# Patient Record
Sex: MALE | Race: BLACK OR AFRICAN AMERICAN | NOT HISPANIC OR LATINO | Employment: OTHER | URBAN - METROPOLITAN AREA
[De-identification: names, ages, dates, MRNs, and addresses within clinical notes are randomized per-mention and may not be internally consistent; named-entity substitution may affect disease eponyms.]

---

## 2024-02-15 ENCOUNTER — APPOINTMENT (OUTPATIENT)
Dept: PRIMARY CARE | Facility: CLINIC | Age: 49
End: 2024-02-15
Payer: COMMERCIAL

## 2024-03-05 ENCOUNTER — TELEPHONE (OUTPATIENT)
Dept: PRIMARY CARE | Facility: CLINIC | Age: 49
End: 2024-03-05
Payer: COMMERCIAL

## 2024-04-01 ENCOUNTER — OFFICE VISIT (OUTPATIENT)
Dept: PRIMARY CARE | Facility: CLINIC | Age: 49
End: 2024-04-01
Payer: COMMERCIAL

## 2024-04-01 VITALS
OXYGEN SATURATION: 94 % | WEIGHT: 271 LBS | RESPIRATION RATE: 16 BRPM | HEART RATE: 100 BPM | SYSTOLIC BLOOD PRESSURE: 127 MMHG | DIASTOLIC BLOOD PRESSURE: 84 MMHG | TEMPERATURE: 98 F

## 2024-04-01 DIAGNOSIS — I10 ESSENTIAL HYPERTENSION: ICD-10-CM

## 2024-04-01 DIAGNOSIS — E11.9 TYPE 2 DIABETES MELLITUS WITHOUT COMPLICATION, WITHOUT LONG-TERM CURRENT USE OF INSULIN (MULTI): Primary | ICD-10-CM

## 2024-04-01 PROBLEM — J45.20 MILD INTERMITTENT ASTHMA WITHOUT COMPLICATION (HHS-HCC): Status: ACTIVE | Noted: 2021-12-01

## 2024-04-01 PROCEDURE — 3074F SYST BP LT 130 MM HG: CPT | Performed by: STUDENT IN AN ORGANIZED HEALTH CARE EDUCATION/TRAINING PROGRAM

## 2024-04-01 PROCEDURE — 99204 OFFICE O/P NEW MOD 45 MIN: CPT | Performed by: STUDENT IN AN ORGANIZED HEALTH CARE EDUCATION/TRAINING PROGRAM

## 2024-04-01 PROCEDURE — 3079F DIAST BP 80-89 MM HG: CPT | Performed by: STUDENT IN AN ORGANIZED HEALTH CARE EDUCATION/TRAINING PROGRAM

## 2024-04-01 RX ORDER — GLIPIZIDE 10 MG/1
10 TABLET ORAL
COMMUNITY
End: 2024-04-01 | Stop reason: SDUPTHER

## 2024-04-01 RX ORDER — HYDROCHLOROTHIAZIDE 25 MG/1
25 TABLET ORAL
Qty: 30 TABLET | Refills: 1 | Status: SHIPPED | OUTPATIENT
Start: 2024-04-01 | End: 2024-05-31

## 2024-04-01 RX ORDER — DULAGLUTIDE 0.75 MG/.5ML
INJECTION, SOLUTION SUBCUTANEOUS
Qty: 2 ML | Refills: 1 | Status: SHIPPED | OUTPATIENT
Start: 2024-04-01

## 2024-04-01 RX ORDER — HYDROCHLOROTHIAZIDE 25 MG/1
25 TABLET ORAL
COMMUNITY
Start: 2020-11-20 | End: 2024-04-01 | Stop reason: SDUPTHER

## 2024-04-01 RX ORDER — GLIPIZIDE 10 MG/1
10 TABLET ORAL
Qty: 60 TABLET | Refills: 1 | Status: SHIPPED | OUTPATIENT
Start: 2024-04-01 | End: 2024-05-31

## 2024-04-01 RX ORDER — DULAGLUTIDE 0.75 MG/.5ML
INJECTION, SOLUTION SUBCUTANEOUS
COMMUNITY
End: 2024-04-01 | Stop reason: SDUPTHER

## 2024-04-01 ASSESSMENT — ENCOUNTER SYMPTOMS
NAUSEA: 0
SHORTNESS OF BREATH: 0
FEVER: 0
COUGH: 0
CONSTIPATION: 0
ACTIVITY CHANGE: 0
WEAKNESS: 0
SPEECH DIFFICULTY: 0
HEMATURIA: 0
DIZZINESS: 0
WHEEZING: 0
VOMITING: 0
NUMBNESS: 0
DYSURIA: 0
ABDOMINAL PAIN: 0

## 2024-04-01 NOTE — PROGRESS NOTES
"Subjective   Patient ID: Lila Harrell is a 48 y.o. male who presents for Establish Care.  HPI    Patient is 48 years old with a history of diabetes, hypertension and hyperlipidemia is here to establish care.  Denies any concerns today.  Request medication refills.  Does not remember the name of the medications just reports \"diabetic medications, cholesterol medications and blood pressure medications\"  No other concerns endorsed  Denies any surgeries or hospitalizations in the past except for ureteral stones  Reports to use tobacco-Black and mild every day.  Reports occasional alcohol use.  Denies recreational drug use    Family history significant for diabetes and hypertension in mom      Review of Systems   Constitutional:  Negative for activity change and fever.   HENT:  Negative for congestion.    Respiratory:  Negative for cough, shortness of breath and wheezing.    Cardiovascular:  Negative for chest pain and leg swelling.   Gastrointestinal:  Negative for abdominal pain, constipation, nausea and vomiting.   Endocrine: Negative for cold intolerance.   Genitourinary:  Negative for dysuria, hematuria and urgency.   Neurological:  Negative for dizziness, speech difficulty, weakness and numbness.   Psychiatric/Behavioral:  Negative for self-injury and suicidal ideas.        Objective   Visit Vitals  /84   Pulse 100   Temp 36.7 °C (98 °F)   Resp 16   Wt 123 kg (271 lb)   SpO2 94%      Physical Exam  Constitutional:       Appearance: Normal appearance.   HENT:      Head: Normocephalic and atraumatic.      Nose: Nose normal.      Mouth/Throat:      Mouth: Mucous membranes are moist.   Eyes:      Conjunctiva/sclera: Conjunctivae normal.      Pupils: Pupils are equal, round, and reactive to light.   Cardiovascular:      Rate and Rhythm: Normal rate and regular rhythm.      Pulses: Normal pulses.      Heart sounds: Normal heart sounds.   Pulmonary:      Effort: Pulmonary effort is normal.      Breath sounds: Normal " breath sounds.   Musculoskeletal:         General: Normal range of motion.      Cervical back: Neck supple.   Skin:     General: Skin is warm.   Neurological:      General: No focal deficit present.      Mental Status: He is alert and oriented to person, place, and time.   Psychiatric:         Mood and Affect: Mood normal.         Behavior: Behavior normal.         Thought Content: Thought content normal.         Judgment: Judgment normal.         Assessment/Plan   Diagnoses and all orders for this visit:  Type 2 diabetes mellitus without complication, without long-term current use of insulin (CMS/[facility])  We discussed on getting blood work today.  We discussed on lifestyle modification and continuing Trulicity and glipizide for now until blood work is obtained.  Once blood work is up-to-date patient to see me in 2 weeks for a follow-up and discussion on medication.  [Interested in getting Ozempic/Mounjaro]  He does not remember the name office cholesterol medication which he reports he will bring in the bottle next visit  -     Hemoglobin A1C; Future  -     Albumin , Urine Random; Future  -     Creatinine, Urine Random; Future  -     Lipid Panel; Future  -     Comprehensive Metabolic Panel; Future  -     TSH with reflex to Free T4 if abnormal; Future  -     CBC; Future  Essential hypertension  Blood pressure at goal.  To continue hydrochlorothiazide [reports he has not taken his medication for the past 1 week]  Follow-up in 2 weeks for review on labs and discussion on Ozempic/Mounjaro based on labs  Patient verbalized understanding agreeable to plan

## 2024-12-08 ENCOUNTER — APPOINTMENT (OUTPATIENT)
Dept: RADIOLOGY | Facility: HOSPITAL | Age: 49
End: 2024-12-08
Payer: COMMERCIAL

## 2024-12-08 ENCOUNTER — HOSPITAL ENCOUNTER (EMERGENCY)
Facility: HOSPITAL | Age: 49
Discharge: HOME | End: 2024-12-08
Attending: EMERGENCY MEDICINE
Payer: COMMERCIAL

## 2024-12-08 ENCOUNTER — CLINICAL SUPPORT (OUTPATIENT)
Dept: EMERGENCY MEDICINE | Facility: HOSPITAL | Age: 49
End: 2024-12-08
Payer: COMMERCIAL

## 2024-12-08 VITALS
OXYGEN SATURATION: 95 % | HEART RATE: 93 BPM | BODY MASS INDEX: 35.12 KG/M2 | DIASTOLIC BLOOD PRESSURE: 90 MMHG | HEIGHT: 73 IN | WEIGHT: 265 LBS | SYSTOLIC BLOOD PRESSURE: 139 MMHG | TEMPERATURE: 99.7 F | RESPIRATION RATE: 18 BRPM

## 2024-12-08 DIAGNOSIS — M25.511 CHRONIC RIGHT SHOULDER PAIN: ICD-10-CM

## 2024-12-08 DIAGNOSIS — G89.29 CHRONIC RIGHT SHOULDER PAIN: ICD-10-CM

## 2024-12-08 DIAGNOSIS — J10.1 INFLUENZA A: Primary | ICD-10-CM

## 2024-12-08 LAB
ATRIAL RATE: 91 BPM
GLUCOSE BLD MANUAL STRIP-MCNC: 241 MG/DL (ref 74–99)
P AXIS: 44 DEGREES
P OFFSET: 193 MS
P ONSET: 134 MS
PR INTERVAL: 178 MS
Q ONSET: 223 MS
QRS COUNT: 15 BEATS
QRS DURATION: 84 MS
QT INTERVAL: 344 MS
QTC CALCULATION(BAZETT): 423 MS
QTC FREDERICIA: 395 MS
R AXIS: 60 DEGREES
T AXIS: 37 DEGREES
T OFFSET: 395 MS
VENTRICULAR RATE: 91 BPM

## 2024-12-08 PROCEDURE — 71046 X-RAY EXAM CHEST 2 VIEWS: CPT | Performed by: STUDENT IN AN ORGANIZED HEALTH CARE EDUCATION/TRAINING PROGRAM

## 2024-12-08 PROCEDURE — 73030 X-RAY EXAM OF SHOULDER: CPT | Mod: RT

## 2024-12-08 PROCEDURE — 99284 EMERGENCY DEPT VISIT MOD MDM: CPT | Mod: 25 | Performed by: EMERGENCY MEDICINE

## 2024-12-08 PROCEDURE — 73030 X-RAY EXAM OF SHOULDER: CPT | Performed by: STUDENT IN AN ORGANIZED HEALTH CARE EDUCATION/TRAINING PROGRAM

## 2024-12-08 PROCEDURE — 72125 CT NECK SPINE W/O DYE: CPT | Performed by: RADIOLOGY

## 2024-12-08 PROCEDURE — 82947 ASSAY GLUCOSE BLOOD QUANT: CPT

## 2024-12-08 PROCEDURE — 99285 EMERGENCY DEPT VISIT HI MDM: CPT | Mod: 25

## 2024-12-08 PROCEDURE — 2500000004 HC RX 250 GENERAL PHARMACY W/ HCPCS (ALT 636 FOR OP/ED)

## 2024-12-08 PROCEDURE — 72125 CT NECK SPINE W/O DYE: CPT

## 2024-12-08 PROCEDURE — 93005 ELECTROCARDIOGRAM TRACING: CPT

## 2024-12-08 PROCEDURE — 96372 THER/PROPH/DIAG INJ SC/IM: CPT

## 2024-12-08 PROCEDURE — 2500000001 HC RX 250 WO HCPCS SELF ADMINISTERED DRUGS (ALT 637 FOR MEDICARE OP)

## 2024-12-08 PROCEDURE — 71046 X-RAY EXAM CHEST 2 VIEWS: CPT

## 2024-12-08 RX ORDER — ACETAMINOPHEN 500 MG
1000 TABLET ORAL EVERY 6 HOURS PRN
Qty: 30 TABLET | Refills: 0 | Status: SHIPPED | OUTPATIENT
Start: 2024-12-08 | End: 2024-12-18

## 2024-12-08 RX ORDER — IBUPROFEN 800 MG/1
800 TABLET ORAL 3 TIMES DAILY
Qty: 21 TABLET | Refills: 0 | Status: SHIPPED | OUTPATIENT
Start: 2024-12-08 | End: 2024-12-15

## 2024-12-08 RX ORDER — ACETAMINOPHEN 325 MG/1
975 TABLET ORAL ONCE
Status: COMPLETED | OUTPATIENT
Start: 2024-12-08 | End: 2024-12-08

## 2024-12-08 RX ORDER — KETOROLAC TROMETHAMINE 15 MG/ML
15 INJECTION, SOLUTION INTRAMUSCULAR; INTRAVENOUS ONCE
Status: COMPLETED | OUTPATIENT
Start: 2024-12-08 | End: 2024-12-08

## 2024-12-08 RX ORDER — BENZONATATE 100 MG/1
100 CAPSULE ORAL EVERY 8 HOURS
Qty: 21 CAPSULE | Refills: 0 | Status: SHIPPED | OUTPATIENT
Start: 2024-12-08 | End: 2024-12-15

## 2024-12-08 ASSESSMENT — PAIN SCALES - GENERAL
PAINLEVEL_OUTOF10: 9
PAINLEVEL_OUTOF10: 10 - WORST POSSIBLE PAIN

## 2024-12-08 ASSESSMENT — COLUMBIA-SUICIDE SEVERITY RATING SCALE - C-SSRS
2. HAVE YOU ACTUALLY HAD ANY THOUGHTS OF KILLING YOURSELF?: NO
6. HAVE YOU EVER DONE ANYTHING, STARTED TO DO ANYTHING, OR PREPARED TO DO ANYTHING TO END YOUR LIFE?: NO
1. IN THE PAST MONTH, HAVE YOU WISHED YOU WERE DEAD OR WISHED YOU COULD GO TO SLEEP AND NOT WAKE UP?: NO

## 2024-12-08 ASSESSMENT — PAIN DESCRIPTION - ORIENTATION: ORIENTATION: RIGHT

## 2024-12-08 ASSESSMENT — PAIN - FUNCTIONAL ASSESSMENT: PAIN_FUNCTIONAL_ASSESSMENT: 0-10

## 2024-12-08 ASSESSMENT — PAIN DESCRIPTION - LOCATION: LOCATION: ARM

## 2024-12-08 ASSESSMENT — PAIN DESCRIPTION - PAIN TYPE: TYPE: ACUTE PAIN

## 2024-12-08 NOTE — ED TRIAGE NOTES
PT presents to ED via EMS for chief complaint of flu like symptoms and right arm pain. PT states his right arm has been hurting for the past week and his cough started on Friday morning and is non productive. PT endorsing chills and chest pain when coughing. PT denies any trauma or injury to his arm. PT states he was seen and discharged from [facility] on Friday for the same symptoms. PT states he would like an xray of his right arm. PT has a history of HTN and diabetes. PT is Aox4 and ambulates on his own.

## 2024-12-08 NOTE — ED PROVIDER NOTES
Emergency Department Provider Note        History of Present Illness     History provided by: Patient  Limitations to History: None  External Records Reviewed with Brief Summary:  ED visit at [facility] on 12/6/2024 for similar complaint, found to have influenza A, also complained of chronic right shoulder pain for multiple years and was provided outpatient orthopedics consult    HPI:  Lila Harrell is a 49 y.o. male with a history of DM, mild intermittent asthma, chronic back pain, hypertension, and nephrolithiasis who presents to the ED with flu symptoms and chronic right shoulder pain.  He reports that when he was seen at [facility] on 12/6/2024 and diagnosed with the flu, he was not given any medications.  He endorses continued chills, productive cough, and chest pain only when coughing. No chest pain associated with exertion. He also reports chronic right shoulder pain for years but reports increased right shoulder pain for the last week, especially on movement.  He would like an x-ray of his shoulder today. He denies any trauma, falls, or injuries. No passing out. No headache, dizziness, vision changes, neck pain, back pain, shortness of breath, nausea, vomiting, abdominal pain, diarrhea, constipation, urinary symptoms, numbness, tingling, or fevers. No leg pain or leg swelling.     Physical Exam   Triage vitals:  T 37.6 °C (99.7 °F)  HR 93  /90  RR 18  O2 95 % None (Room air)    Physical Exam  Constitutional:       General: He is not in acute distress.     Appearance: He is not ill-appearing or toxic-appearing.   HENT:      Head: Normocephalic and atraumatic.      Nose: Congestion present. No rhinorrhea.      Mouth/Throat:      Mouth: Mucous membranes are moist.      Pharynx: Posterior oropharyngeal erythema present. No oropharyngeal exudate.   Eyes:      General: No scleral icterus.     Extraocular Movements: Extraocular movements intact.      Conjunctiva/sclera: Conjunctivae normal.    Cardiovascular:      Rate and Rhythm: Normal rate and regular rhythm.      Pulses: Normal pulses.      Heart sounds: Normal heart sounds.   Pulmonary:      Effort: Pulmonary effort is normal. No respiratory distress.      Breath sounds: Normal breath sounds. No wheezing.   Abdominal:      General: There is no distension.      Palpations: Abdomen is soft.      Tenderness: There is no abdominal tenderness. There is no guarding or rebound.   Musculoskeletal:         General: No deformity or signs of injury. Normal range of motion.      Right shoulder: Tenderness present. No swelling, deformity, bony tenderness or crepitus. Normal strength. Normal pulse.      Left shoulder: Normal.      Cervical back: Normal range of motion and neck supple. No rigidity or tenderness.      Comments: Pain with active and passive ROM of right shoulder. No overlying warmth, rashes, or erythema.   Tenderness over right deltoid  Compartments are soft.   Neurovascularly intact throughout.   Back: no midline spinal tenderness, no step-offs or deformities.    Skin:     General: Skin is warm and dry.      Capillary Refill: Capillary refill takes less than 2 seconds.      Findings: No rash.   Neurological:      General: No focal deficit present.      Mental Status: He is alert and oriented to person, place, and time.      Cranial Nerves: No cranial nerve deficit.      Sensory: No sensory deficit.      Motor: No weakness.      Gait: Gait normal.   Psychiatric:         Mood and Affect: Mood normal.         Behavior: Behavior normal.          Medical Decision Making & ED Course   Medical Decision Makin y.o. male with a history of DM, mild intermittent asthma, chronic back pain, hypertension, and nephrolithiasis who presents with flu symptoms and chronic right shoulder pain.  He was diagnosed with influenza A two days ago at [facility].  He is complaining of chest pain only with cough, however EKG reveals normal sinus rhythm with no concerns for  ischemia. He has no chest pain when he is not coughing. No shortness of breath. No chest pain associated with exertion. In the Emergency Department, hospital records were reviewed. The patient is afebrile with stable vital signs. The patient is in no respiratory distress, satting well on room air. Cardiac enzymes and further cardiac workup were considered, however I have very low suspicion for ACS given the reproducibility of his pain with cough.  Chest x-ray was ordered to evaluate for superimposed bacterial pneumonia or pneumothorax related to his productive cough.    As for his chronic shoulder pain, imaging of both the right shoulder and CT of the cervical spine was obtained to evaluate for joint abnormalities in the shoulder versus pain due to cervical stenosis or radiculopathy. He is neurologically and neurovascularly intact. There is no overlying erythema, warmth to touch, or exam findings to suggest septic joint. Patient was treated for pain with Toradol and Tylenol.  At the time of signout, imaging studies were pending.  ----    Differential diagnoses considered include but are not limited to: Influenza, bacterial pneumonia, pneumothorax, shoulder osteoarthritis, cervical radiculopathy, ACS, musculoskeletal pain, strain/sprain, rotator cuff injury     Social Determinants of Health which Significantly Impact Care: None identified     EKG Independent Interpretation: The EKG obtained at 0456 was independently interpreted by myself. It demonstrates sinus rhythm with a ventricular rate of 91.  Normal axis. Intervals normal. ST segments showed no signs of ischemia.  No significant change compared to prior EKG from 4/29/2021.    Independent Result Review and Interpretation: Relevant laboratory and radiographic results were reviewed and independently interpreted by myself.  As necessary, they are commented on in the ED Course.    Chronic conditions affecting the patient's care: As documented above in MDM    The  patient was discussed with the following consultants/services: None    Care Considerations: As documented above in MDM    ED Course:  Diagnoses as of 12/08/24 0648   Influenza A   Chronic right shoulder pain     Disposition   Patient was signed out to oncoming [facility] at 0650 pending completion of their work-up.  Please see the next provider's transition of care note for the remainder of the patient's care.     Patient seen and discussed with ED attending physician.    Jennifer Guerra MD  Emergency Medicine      Jennifer Guerra MD  Resident  12/08/24 0649      I saw and evaluated the patient. I personally obtained the key and critical portions of the history and physical exam or was physically present for key and critical portions performed by the resident/fellow. I reviewed the resident/fellow's documentation and discussed the patient with the resident/fellow. I agree with the resident/fellow's medical decision making as documented in the note. Patient remains stable and was signed out to my colleague, Dr. Juliette Darling at 7 AM pending imaging results and pending the remainder of his ED course and final disposition.    MD Ariela Julian MD  12/09/24 0033

## 2024-12-08 NOTE — Clinical Note
Lila Harrell was seen and treated in our emergency department on 12/8/2024.  He may return to work on 12/11/2024.       If you have any questions or concerns, please don't hesitate to call.      Nasrin Guzman, APRN-CNP

## 2024-12-08 NOTE — PROGRESS NOTES
Handoff received: 12/8/2024 0 700 handoff care received from Dr. Guerra at this time. Please refer to her note for initial plan of care of this patient.     Patient signed out to me pending workup results and reevaluation.  I agree with my prior providers assessment and plan of care.  Workup results were remarkable unremarkable.  See results review.  I did not need to order any additional laboratory/radiologic studies or medications for the patient during my course of care.  Clinical presentation likely consistent with influenza and musculoskeletal pain. Findings were discussed with patient and patient agreeable for plan to discharge home with primary care provider  follow up in 1 week for further management and to continue warm compresses, ibuprofen p.o. and Tylenol p.o. at home as needed for pain.  Prior medications reviewed and Patient prescribed Tessalon pearls p.o for cough management at home.  He is out of the window for Tamiflu.  Return precautions discussed and patient acknowledged understanding.  All questions and concerns answered prior to discharge.          *Please note that portions of this note may have been completed with a voice recognition program.  Efforts were made to edit the dictations but occasionally, words are mis-transcribed.

## 2025-09-11 ENCOUNTER — APPOINTMENT (OUTPATIENT)
Dept: PRIMARY CARE | Facility: CLINIC | Age: 50
End: 2025-09-11
Payer: COMMERCIAL

## 2025-10-08 ENCOUNTER — APPOINTMENT (OUTPATIENT)
Dept: PRIMARY CARE | Facility: CLINIC | Age: 50
End: 2025-10-08
Payer: COMMERCIAL